# Patient Record
Sex: FEMALE | Race: OTHER | HISPANIC OR LATINO | URBAN - METROPOLITAN AREA
[De-identification: names, ages, dates, MRNs, and addresses within clinical notes are randomized per-mention and may not be internally consistent; named-entity substitution may affect disease eponyms.]

---

## 2021-06-17 ENCOUNTER — EMERGENCY (EMERGENCY)
Facility: HOSPITAL | Age: 61
LOS: 1 days | Discharge: HOME | End: 2021-06-17
Attending: STUDENT IN AN ORGANIZED HEALTH CARE EDUCATION/TRAINING PROGRAM
Payer: SELF-PAY

## 2021-06-17 VITALS
OXYGEN SATURATION: 97 % | HEART RATE: 90 BPM | WEIGHT: 130.07 LBS | RESPIRATION RATE: 16 BRPM | SYSTOLIC BLOOD PRESSURE: 183 MMHG | DIASTOLIC BLOOD PRESSURE: 106 MMHG | TEMPERATURE: 98 F

## 2021-06-17 VITALS
SYSTOLIC BLOOD PRESSURE: 177 MMHG | HEART RATE: 96 BPM | RESPIRATION RATE: 16 BRPM | DIASTOLIC BLOOD PRESSURE: 106 MMHG | OXYGEN SATURATION: 97 % | TEMPERATURE: 99 F

## 2021-06-17 PROCEDURE — 70450 CT HEAD/BRAIN W/O DYE: CPT

## 2021-06-17 PROCEDURE — 70450 CT HEAD/BRAIN W/O DYE: CPT | Mod: 26

## 2021-06-17 PROCEDURE — 99283 EMERGENCY DEPT VISIT LOW MDM: CPT

## 2021-06-17 PROCEDURE — 99284 EMERGENCY DEPT VISIT MOD MDM: CPT | Mod: 25

## 2021-06-17 PROCEDURE — 93005 ELECTROCARDIOGRAM TRACING: CPT

## 2021-06-17 PROCEDURE — 72125 CT NECK SPINE W/O DYE: CPT

## 2021-06-17 PROCEDURE — 72125 CT NECK SPINE W/O DYE: CPT | Mod: 26

## 2021-06-17 NOTE — ED PROVIDER NOTE - PHYSICAL EXAMINATION
Sohrawardy:   VS reviewed  NAD  [id number]  nc/at, neck rom normal, supple  rrr  normal resp effort  abdomen soft, nd/nt   cn intact, strength 5/5, sensation intact, no neuro deficits  normal gait

## 2021-06-17 NOTE — ED PROVIDER NOTE - PROGRESS NOTE DETAILS
CTH negative. patient refused xrays. ambulating wo issue, no ttp/signs of trauma elsewhere on body. repeat neuro exam benign. wants to go home with family. stable for dc home. return precautions and pmd f/up given.

## 2021-06-17 NOTE — ED PROVIDER NOTE - NS ED ROS FT
(+) fall  (-) fevers, chills  (-) dizziness, lightheadedness, vision changes  (-) neck pain, stiffness  (-) cp, palpitations  (-) sob, cough, hemoptysis  (-) abd pain, n/v/d/c   (-) urinary sxs, back pain  (-) weakness, paresthesias

## 2021-06-17 NOTE — ED PROVIDER NOTE - PATIENT PORTAL LINK FT
You can access the FollowMyHealth Patient Portal offered by [facility] by registering at the following website: [email or web address]. By joining [facility]’s FollowMyHealth portal, you will also be able to view your health information using other applications (apps) compatible with our system.
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
## 2021-06-17 NOTE — ED PROVIDER NOTE - CARE PLAN
Principal Discharge DX:	Fall, initial encounter   Principal Discharge DX:	Fall, initial encounter  Secondary Diagnosis:	Scalp hematoma, initial encounter
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
## 2025-02-17 NOTE — ED ADULT TRIAGE NOTE - TEMPERATURE IN CELSIUS (DEGREES C)
36.7 How Severe Are Your Spot(S)?: mild What Type Of Note Output Would You Prefer (Optional)?: Bullet Format What Is The Reason For Today's Visit?: Full Body Skin Examination What Is The Reason For Today's Visit? (Being Monitored For X): the development of new lesions